# Patient Record
Sex: FEMALE | Race: ASIAN | NOT HISPANIC OR LATINO | URBAN - METROPOLITAN AREA
[De-identification: names, ages, dates, MRNs, and addresses within clinical notes are randomized per-mention and may not be internally consistent; named-entity substitution may affect disease eponyms.]

---

## 2021-06-15 ENCOUNTER — OFFICE (OUTPATIENT)
Dept: URBAN - METROPOLITAN AREA CLINIC 19 | Facility: CLINIC | Age: 51
End: 2021-06-15

## 2021-06-15 VITALS
DIASTOLIC BLOOD PRESSURE: 74 MMHG | HEIGHT: 63 IN | WEIGHT: 170 LBS | HEART RATE: 69 BPM | OXYGEN SATURATION: 98 % | SYSTOLIC BLOOD PRESSURE: 121 MMHG

## 2021-06-15 DIAGNOSIS — K21.9 GASTRO-ESOPHAGEAL REFLUX DISEASE WITHOUT ESOPHAGITIS: ICD-10-CM

## 2021-06-15 DIAGNOSIS — K59.00 CONSTIPATION, UNSPECIFIED: ICD-10-CM

## 2021-06-15 DIAGNOSIS — R14.0 ABDOMINAL DISTENSION (GASEOUS): ICD-10-CM

## 2021-06-15 LAB — T-TRANSGLUTAMINASE (TTG) IGA: <2 U/ML

## 2021-06-15 PROCEDURE — 99204 OFFICE O/P NEW MOD 45 MIN: CPT

## 2021-06-15 RX ORDER — FAMOTIDINE 40 MG/1
40 TABLET, FILM COATED ORAL
Qty: 30 | Refills: 3 | Status: ACTIVE

## 2021-06-15 NOTE — SERVICENOTES
The patient's assessment was reviewed with Dr. Minor and a collaborative plan of care was established.

## 2022-09-16 NOTE — SERVICEHPINOTES
50-year-old  female here for complaints of chronic constipation, increased gas and bloating.  She has tried multiple over-the-counter laxatives and fiber supplements with no apparent benefit.  She has tried MiraLax, Dulcolax, fiber gummies, docusate sodium, etc.  She apparently recently had a full thyroid an endocrine workup with her PCP that was unremarkable.  She was recently started on metformin and progesterone by her gynecologist, but reports her constipation has actually worsened.  She admittedly will not be able to have a bowel movement unless she takes multiple Dulcolax tablets.  She has had some mild reflux and gas and was started on famotidine 20 mg which seems to mildly improved her symptoms.  She denies nausea, vomiting, dysphagia, abdominal pain or overt GI bleeding.  She denies any previous GI tests or studies.  Family history is negative for celiac disease, IBD and colon neoplasm. 
This document is complete and the patient is ready for discharge.